# Patient Record
Sex: FEMALE | Race: WHITE | NOT HISPANIC OR LATINO | URBAN - METROPOLITAN AREA
[De-identification: names, ages, dates, MRNs, and addresses within clinical notes are randomized per-mention and may not be internally consistent; named-entity substitution may affect disease eponyms.]

---

## 2018-08-02 ENCOUNTER — APPOINTMENT (RX ONLY)
Dept: URBAN - METROPOLITAN AREA CLINIC 304 | Facility: CLINIC | Age: 58
Setting detail: DERMATOLOGY
End: 2018-08-02

## 2018-08-02 DIAGNOSIS — D22 MELANOCYTIC NEVI: ICD-10-CM

## 2018-08-02 DIAGNOSIS — L81.4 OTHER MELANIN HYPERPIGMENTATION: ICD-10-CM

## 2018-08-02 PROBLEM — L85.3 XEROSIS CUTIS: Status: ACTIVE | Noted: 2018-08-02

## 2018-08-02 PROBLEM — D22.5 MELANOCYTIC NEVI OF TRUNK: Status: ACTIVE | Noted: 2018-08-02

## 2018-08-02 PROCEDURE — ? OTHER

## 2018-08-02 PROCEDURE — ? COUNSELING

## 2018-08-02 PROCEDURE — 99202 OFFICE O/P NEW SF 15 MIN: CPT

## 2018-08-02 ASSESSMENT — LOCATION ZONE DERM
LOCATION ZONE: TRUNK
LOCATION ZONE: FACE

## 2018-08-02 ASSESSMENT — LOCATION DETAILED DESCRIPTION DERM
LOCATION DETAILED: RIGHT RIB CAGE
LOCATION DETAILED: LEFT INFERIOR CENTRAL MALAR CHEEK

## 2018-08-02 ASSESSMENT — LOCATION SIMPLE DESCRIPTION DERM
LOCATION SIMPLE: LEFT CHEEK
LOCATION SIMPLE: ABDOMEN

## 2018-08-02 NOTE — PROCEDURE: OTHER
Other (Free Text): Obagi C
Detail Level: Simple
Note Text (......Xxx Chief Complaint.): This diagnosis correlates with the

## 2021-02-17 ENCOUNTER — APPOINTMENT (RX ONLY)
Dept: URBAN - METROPOLITAN AREA CLINIC 304 | Facility: CLINIC | Age: 61
Setting detail: DERMATOLOGY
End: 2021-02-17

## 2021-02-17 DIAGNOSIS — L81.4 OTHER MELANIN HYPERPIGMENTATION: ICD-10-CM

## 2021-02-17 DIAGNOSIS — D22 MELANOCYTIC NEVI: ICD-10-CM

## 2021-02-17 PROBLEM — D22.5 MELANOCYTIC NEVI OF TRUNK: Status: ACTIVE | Noted: 2021-02-17

## 2021-02-17 PROCEDURE — ? TREATMENT REGIMEN

## 2021-02-17 PROCEDURE — 99213 OFFICE O/P EST LOW 20 MIN: CPT

## 2021-02-17 PROCEDURE — ? COUNSELING

## 2021-02-17 PROCEDURE — ? PRESCRIPTION

## 2021-02-17 RX ORDER — FLUOCINOLONE ACETONIDE, HYDROQUINONE, AND TRETINOIN .1; 40; .5 MG/G; MG/G; MG/G
CREAM TOPICAL QHS
Qty: 1 | Refills: 3 | Status: ERX | COMMUNITY
Start: 2021-02-17

## 2021-02-17 RX ADMIN — FLUOCINOLONE ACETONIDE, HYDROQUINONE, AND TRETINOIN: .1; 40; .5 CREAM TOPICAL at 00:00

## 2021-02-17 ASSESSMENT — LOCATION DETAILED DESCRIPTION DERM
LOCATION DETAILED: LEFT CENTRAL MALAR CHEEK
LOCATION DETAILED: LEFT INFERIOR MEDIAL MALAR CHEEK
LOCATION DETAILED: SUPERIOR THORACIC SPINE

## 2021-02-17 ASSESSMENT — LOCATION ZONE DERM
LOCATION ZONE: FACE
LOCATION ZONE: TRUNK

## 2021-02-17 ASSESSMENT — LOCATION SIMPLE DESCRIPTION DERM
LOCATION SIMPLE: LEFT CHEEK
LOCATION SIMPLE: UPPER BACK